# Patient Record
Sex: MALE | Race: WHITE | URBAN - METROPOLITAN AREA
[De-identification: names, ages, dates, MRNs, and addresses within clinical notes are randomized per-mention and may not be internally consistent; named-entity substitution may affect disease eponyms.]

---

## 2020-10-14 ENCOUNTER — TELEPHONE (OUTPATIENT)
Dept: ORTHOPEDIC SURGERY | Age: 65
End: 2020-10-14

## 2020-10-14 ENCOUNTER — OFFICE VISIT (OUTPATIENT)
Dept: ORTHOPEDIC SURGERY | Age: 65
End: 2020-10-14
Payer: MEDICARE

## 2020-10-14 PROCEDURE — 95886 MUSC TEST DONE W/N TEST COMP: CPT | Performed by: PHYSICAL MEDICINE & REHABILITATION

## 2020-10-14 PROCEDURE — 95908 NRV CNDJ TST 3-4 STUDIES: CPT | Performed by: PHYSICAL MEDICINE & REHABILITATION

## 2020-10-14 NOTE — PROGRESS NOTES
[address] MEDICINE      Patient: Jordan Raines Age: 72 Years 0 Months  Sex: Male Date: [id number]  YOB: 1955 Ref. Phys.: Melsarah Man NP  Notes:  2 wks right foot drop; chronic LBP worse x3 wks      Sensory NCS      Nerve / Sites Peak PeakAmp Dist Ricardo    ms µV cm m/s   R SURAL   1. Calf 3.80 7.3 14 45.9       Motor NCS      Nerve / Sites Lat Amp Amp Dist Ricardo    ms mV % cm m/s   R COMM PERONEAL - EDB   1. Ankle 4.75 5.5 100     2. FibHead 11.70 5.3 95.6 31 44.6   3. Knee 15.15 3.4 60.7 10 29.0   R TIBIAL (KNEE) - AH   1. Ankle 5.20 5.1 100         EMG Summary Table     Spontaneous MUAP Recruit. Ins. Act Fibs. PSW Fasics. H.F. Amp. Dur. Poly's. Pattern   R. GASTROCN (MED) N None None None None N N N N   R. TIB ANTERIOR N None None None None N ++ ++ N   R. PERON LONGUS N None None None None ++ ++ ++ N   R. GLUTEUS MED N None None None None ++ + ++ N   R. GLUTEUS MAX N None None None None N N N N   R. LUMB PSP (L) N None None None None N N N N       Summary: Right peroneal CMAP conduction velocity is slowed across the knee with proximal drop in amplitude. Monopolar exam shows large amplitude polyphasic motor units to right L5 anterior myotomes and right common peroneal muscles. Impression: Abnormal examination. There is electrodiagnostic evidence of:    1. Mild to moderate chronic right L5 radiculopathy  2.  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

## 2020-10-14 NOTE — TELEPHONE ENCOUNTER
L/M ON HOME AND CELL NUMBERS TO CALL THE OFFICE  NEED TO SCHEDULE EMG RLE     WIFE HAD CALLED ASKING FOR [phone] TO DO THE EMG AS SHE HAD HERS DONE HERE AND WANTED HER  TO HAVE HIS HERE AS WELL.